# Patient Record
Sex: FEMALE | Employment: STUDENT | URBAN - METROPOLITAN AREA
[De-identification: names, ages, dates, MRNs, and addresses within clinical notes are randomized per-mention and may not be internally consistent; named-entity substitution may affect disease eponyms.]

---

## 2023-06-07 ENCOUNTER — OFFICE VISIT (OUTPATIENT)
Dept: OCCUPATIONAL MEDICINE | Age: 33
End: 2023-06-07
Attending: PHYSICIAN ASSISTANT

## 2023-06-07 DIAGNOSIS — Z01.84 IMMUNITY STATUS TESTING: Primary | ICD-10-CM

## 2023-06-07 LAB
RUBV IGG SER QL: POSITIVE
RUBV IGG SER-ACNC: 37.1 IU/ML (ref 10–?)

## 2023-06-07 PROCEDURE — 86735 MUMPS ANTIBODY: CPT

## 2023-06-07 PROCEDURE — 86762 RUBELLA ANTIBODY: CPT

## 2023-06-07 PROCEDURE — 86765 RUBEOLA ANTIBODY: CPT

## 2023-06-09 ENCOUNTER — OFFICE VISIT (OUTPATIENT)
Dept: OCCUPATIONAL MEDICINE | Age: 33
End: 2023-06-09
Attending: PHYSICIAN ASSISTANT

## 2023-06-09 LAB
MEV IGG SER-ACNC: 10.7 AU/ML (ref 16.5–?)
MUV IGG SER IA-ACNC: 29.6 AU/ML (ref 11–?)

## 2023-06-14 ENCOUNTER — OFFICE VISIT (OUTPATIENT)
Dept: OCCUPATIONAL MEDICINE | Age: 33
End: 2023-06-14
Attending: PHYSICIAN ASSISTANT

## 2024-05-14 ENCOUNTER — LAB REQUISITION (OUTPATIENT)
Age: 34
End: 2024-05-14

## 2024-05-14 DIAGNOSIS — R10.13 EPIGASTRIC PAIN: ICD-10-CM

## 2024-05-14 DIAGNOSIS — K62.5 RECTAL BLEEDING: ICD-10-CM

## 2024-05-14 DIAGNOSIS — K21.9 GERD (GASTROESOPHAGEAL REFLUX DISEASE): ICD-10-CM

## 2024-05-14 DIAGNOSIS — Z80.0 FAMILY HISTORY OF COLON CANCER: ICD-10-CM

## 2024-05-14 PROCEDURE — 88305 TISSUE EXAM BY PATHOLOGIST: CPT | Performed by: INTERNAL MEDICINE

## 2025-01-15 ENCOUNTER — OFFICE VISIT (OUTPATIENT)
Dept: INTERNAL MEDICINE CLINIC | Facility: CLINIC | Age: 35
End: 2025-01-15
Payer: COMMERCIAL

## 2025-01-15 ENCOUNTER — LAB ENCOUNTER (OUTPATIENT)
Dept: LAB | Age: 35
End: 2025-01-15
Attending: INTERNAL MEDICINE
Payer: COMMERCIAL

## 2025-01-15 VITALS
HEIGHT: 67 IN | TEMPERATURE: 98 F | OXYGEN SATURATION: 98 % | DIASTOLIC BLOOD PRESSURE: 82 MMHG | RESPIRATION RATE: 16 BRPM | HEART RATE: 78 BPM | WEIGHT: 207.63 LBS | SYSTOLIC BLOOD PRESSURE: 110 MMHG | BODY MASS INDEX: 32.59 KG/M2

## 2025-01-15 DIAGNOSIS — E61.1 IRON DEFICIENCY: ICD-10-CM

## 2025-01-15 DIAGNOSIS — R51.9 CHRONIC NONINTRACTABLE HEADACHE, UNSPECIFIED HEADACHE TYPE: ICD-10-CM

## 2025-01-15 DIAGNOSIS — R79.89 ELEVATED PLATELET COUNT: Primary | ICD-10-CM

## 2025-01-15 DIAGNOSIS — R79.89 ELEVATED PLATELET COUNT: ICD-10-CM

## 2025-01-15 DIAGNOSIS — G89.29 CHRONIC NONINTRACTABLE HEADACHE, UNSPECIFIED HEADACHE TYPE: ICD-10-CM

## 2025-01-15 DIAGNOSIS — E66.9 OBESITY (BMI 30-39.9): ICD-10-CM

## 2025-01-15 LAB
BASOPHILS # BLD AUTO: 0.05 X10(3) UL (ref 0–0.2)
BASOPHILS NFR BLD AUTO: 0.4 %
DEPRECATED HBV CORE AB SER IA-ACNC: 22 NG/ML
EOSINOPHIL # BLD AUTO: 0.14 X10(3) UL (ref 0–0.7)
EOSINOPHIL NFR BLD AUTO: 1.2 %
ERYTHROCYTE [DISTWIDTH] IN BLOOD BY AUTOMATED COUNT: 13.7 %
HCT VFR BLD AUTO: 40.5 %
HGB BLD-MCNC: 13.2 G/DL
IMM GRANULOCYTES # BLD AUTO: 0.05 X10(3) UL (ref 0–1)
IMM GRANULOCYTES NFR BLD: 0.4 %
IRON SATN MFR SERPL: 14 %
IRON SERPL-MCNC: 50 UG/DL
LYMPHOCYTES # BLD AUTO: 2.32 X10(3) UL (ref 1–4)
LYMPHOCYTES NFR BLD AUTO: 19.1 %
MCH RBC QN AUTO: 29.7 PG (ref 26–34)
MCHC RBC AUTO-ENTMCNC: 32.6 G/DL (ref 31–37)
MCV RBC AUTO: 91.2 FL
MONOCYTES # BLD AUTO: 0.57 X10(3) UL (ref 0.1–1)
MONOCYTES NFR BLD AUTO: 4.7 %
NEUTROPHILS # BLD AUTO: 9.02 X10 (3) UL (ref 1.5–7.7)
NEUTROPHILS # BLD AUTO: 9.02 X10(3) UL (ref 1.5–7.7)
NEUTROPHILS NFR BLD AUTO: 74.2 %
PLATELET # BLD AUTO: 383 10(3)UL (ref 150–450)
RBC # BLD AUTO: 4.44 X10(6)UL
TOTAL IRON BINDING CAPACITY: 366 UG/DL (ref 250–425)
TRANSFERRIN SERPL-MCNC: 304 MG/DL (ref 250–380)
WBC # BLD AUTO: 12.2 X10(3) UL (ref 4–11)

## 2025-01-15 PROCEDURE — 85025 COMPLETE CBC W/AUTO DIFF WBC: CPT | Performed by: INTERNAL MEDICINE

## 2025-01-15 PROCEDURE — 83540 ASSAY OF IRON: CPT | Performed by: INTERNAL MEDICINE

## 2025-01-15 PROCEDURE — 83550 IRON BINDING TEST: CPT | Performed by: INTERNAL MEDICINE

## 2025-01-15 PROCEDURE — 3008F BODY MASS INDEX DOCD: CPT | Performed by: INTERNAL MEDICINE

## 2025-01-15 PROCEDURE — 3079F DIAST BP 80-89 MM HG: CPT | Performed by: INTERNAL MEDICINE

## 2025-01-15 PROCEDURE — 99203 OFFICE O/P NEW LOW 30 MIN: CPT | Performed by: INTERNAL MEDICINE

## 2025-01-15 PROCEDURE — 82728 ASSAY OF FERRITIN: CPT | Performed by: INTERNAL MEDICINE

## 2025-01-15 PROCEDURE — 3074F SYST BP LT 130 MM HG: CPT | Performed by: INTERNAL MEDICINE

## 2025-01-15 RX ORDER — IRON HEME POLYPEPTIDE/FOLIC AC 12-1MG
TABLET ORAL
COMMUNITY

## 2025-01-15 RX ORDER — IRON,CARB/VIT C/VIT B12/FOLIC 100-250-1
TABLET ORAL
COMMUNITY

## 2025-01-15 RX ORDER — LEVONORGESTREL 52 MG/1
INTRAUTERINE DEVICE INTRAUTERINE
COMMUNITY
Start: 2024-10-23

## 2025-01-15 RX ORDER — BACILLUS COAGULANS/INULIN 1B-250 MG
CAPSULE ORAL
COMMUNITY

## 2025-01-15 NOTE — PROGRESS NOTES
[facility] Internal Medicine Office Note  Chief Complaint:   Chief Complaint   Patient presents with    New Patient       HPI:   This is a 34 year old female coming in for establishing care  HPI    She was taking compounded semaglutide for weight loss but it was getting too expensive   Wegovy denied by her insurance previously and a prior authorization was not completed as her physician left the practice  She notes history of binge eating.     Pap smear is up to date; record not available    Platelets were high on recent blood work; record not available   Normal platelet level at Dr. Boyd earlier in 2024      PMH:   -Depression - sees psychiatrist and on prozac.   -Binge eating   -PMDD  -Bleeding with straining for bowel movements. Saw Dr. Boyd and had EGD and colonoscopy which showed gastritis and diverticulosis and internal hemorrhoids     PSH: knee arthroscopy and repair of ligament and completed physical therapy     \"Bad headaches\". Some unilateral and extend back. 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     IUD placed in Oct   Spotting just stopped     Annual physical was in April 2024      Patient Active Problem List   Diagnosis    Routine general medical examination at a health care facility    Screening for tuberculosis    Alcohol use    Premenopausal patient    Family history of anemia     Past Surgical History:   Procedure Laterality Date    Oral surgery procedure Bilateral     wisdom teeth removed     Family History   Problem Relation Age of Onset    High Blood Pressure Father     Colon Cancer Maternal Cousin Male     Crohn's Disease Maternal Uncle         I reviewed her's Past Medical History, Past Surgical History, Family History and   Social History updated shows  Social History     Socioeconomic History    Marital status:     Tobacco Use    Smoking status: Never   Vaping Use    Vaping status: Never Used   Substance and Sexual Activity    Alcohol use: Yes     Alcohol/week: 0.0 standard drinks of alcohol    Drug use: Yes     Types: Cannabis     Comment: gummies occ     Social Drivers of Health     Food Insecurity: No Food Insecurity (1/15/2025)    NCSS - Food Insecurity     Worried About Running Out of Food in the Last Year: No     Ran Out of Food in the Last Year: No   Transportation Needs: No Transportation Needs (1/15/2025)    NCSS - Transportation     Lack of Transportation: No    [facility]    Social Connections   Housing Stability: Not At Risk (1/15/2025)    NCSS - Housing/Utilities     Has Housing: Yes     Worried About Losing Housing: No     Unable to Get Utilities: No     Allergies:  Allergies[1]  Current Outpatient Medications   Medication Sig Dispense Refill    Bacillus Coagulans-Inulin (PROBIOTIC) 1-250 BILLION-MG Oral Cap       cholecalciferol (DIALYVITE VITAMIN D 5000) 125 MCG (5000 UT) Oral Cap       FLUoxetine 20 MG Oral Cap Take 3 capsules (60 mg total) by mouth daily.      Iron-Vit C-Vit B12-Folic Acid (IRON 100 PLUS) 100-250-0.025-1 MG Oral Tab       Levonorgestrel (MIRENA, 52 MG,) 20 MCG/DAY Intrauterine IUD       L-Lysine 1000 MG Oral Tab       omeprazole 20 MG Oral Capsule Delayed Release Take 1 capsule (20 mg total) by mouth before breakfast.      Multiple Vitamin (MULTI-VITAMIN DAILY) Oral Tab Take 1 tablet by mouth daily.           REVIEW OF SYSTEMS:   Review of Systems   Constitutional:  Negative for fever.   HENT:  Negative for congestion.    Eyes:  Negative for visual disturbance.   Respiratory:  Negative for shortness of breath.    Cardiovascular:  Negative for chest pain.   Gastrointestinal:  Negative for constipation.   Genitourinary:  Negative for dysuria.   Neurological: Negative.    Hematological: Negative.    Psychiatric/Behavioral:  Negative.          EXAM:   /82   Pulse 78   Temp 97.8 °F (36.6 °C) (Temporal)   Resp 16   Ht 5' 7\" (1.702 m)   Wt 207 lb 9.6 oz (94.2 kg)   SpO2 98%   BMI 32.51 kg/m²  Estimated body mass index is 32.51 kg/m² as calculated from the following:    Height as of this encounter: 5' 7\" (1.702 m).    Weight as of this encounter: 207 lb 9.6 oz (94.2 kg).   Vital signs reviewed. Appears stated age, well groomed.  Physical Exam  Vitals reviewed.   Constitutional:       General: She is not in acute distress.     Appearance: She is well-developed.   HENT:      Head: Normocephalic and atraumatic.   Eyes:      Conjunctiva/sclera: Conjunctivae normal.   Cardiovascular:      Rate and Rhythm: Normal rate and regular rhythm.      Heart sounds: Normal heart sounds.   Pulmonary:      Effort: Pulmonary effort is normal.      Breath sounds: Normal breath sounds.   Musculoskeletal:      Cervical back: Neck supple.   Skin:     General: Skin is warm and dry.   Neurological:      Mental Status: She is alert.          ASSESSMENT AND PLAN:   Adriana Carlos is a 34 year old female with  1. Elevated platelet count    2. Chronic nonintractable headache, unspecified headache type    3. Iron deficiency          The plan is as follows  Adriana was seen today for new patient.    Diagnoses and all orders for this visit:    Elevated platelet count -recheck platelet level.  Outside record not available but has been requested.  -     CBC With Differential With Platelet; Future    Chronic nonintractable headache, unspecified headache type - Take aleve/naproxen 2 tablets at onset of headache pain and take with food. Can take every 12 hours    Iron deficiency -low iron level previously, recheck  -     Iron And Tibc [E]; Future  -     Ferritin [E]; Future    Obesity (BMI 30-39.9) -she previously took compounded semaglutide but discontinued due to cost.  Instructed her to reach out to her insurance to find out if Wegovy is covered.  We also  discussed Contrave as another option especially in the setting of history of binge eating.  Instructed patient to discuss with her psychiatrist about the addition of Contrave due to bupropion component interaction with fluoxetine.  She voiced understanding and will update us on these items via [employer] or calling the office.  Discussed potential side effects and demonstrated how to use Wegovy.  Discussed potential side effects of Contrave.  Gave information for the medication websites to learn more about them    Orders Placed This Encounter   Procedures    CBC With Differential With Platelet    Iron And Tibc [E]    Ferritin [E]       Meds & Refills for this Visit:  Requested Prescriptions      No prescriptions requested or ordered in this encounter       Imaging & Consults:  None    Health Maintenance Due   Topic Date Due    Annual Physical  Never done    Pap Smear  08/24/2018    COVID-19 Vaccine (3 - 2024-25 season) 09/01/2024    Influenza Vaccine (1) 10/01/2024    Annual Depression Screening  Never done     Patient/Caregiver Education: Patient/Caregiver Education: There are no barriers to learning. Medical education done. Outcome: Patient verbalizes understanding. Patient is notified to call with any questions, complications, allergies, or worsening or changing symptoms.  Patient is to call with any side effects or complications from the treatments as a result of today.     Isatu Li MD         [1]   Allergies  Allergen Reactions    Clindamycin HIVES, RASH and SWELLING    Sulfamethoxazole W/Trimethoprim HIVES, RASH and SWELLING    Sulfa Antibiotics

## 2025-01-15 NOTE — PATIENT INSTRUCTIONS
Blood work     Check with your insurance about coverage for Wegovy, Contrave   [email or web address]    Check with your psychiatrist about adding contrave (bupropion, naltrexone).   [email or web address]     Take aleve/naproxen 2 tablets at onset of headache pain and take with food. Can take every 12 hours

## 2025-01-17 DIAGNOSIS — R79.89 ELEVATED PLATELET COUNT: Primary | ICD-10-CM

## 2025-01-23 ENCOUNTER — E-VISIT (OUTPATIENT)
Dept: TELEHEALTH | Age: 35
End: 2025-01-23
Payer: COMMERCIAL

## 2025-01-23 DIAGNOSIS — A08.4 VIRAL GASTROENTERITIS: Primary | ICD-10-CM

## 2025-01-23 DIAGNOSIS — Z02.89 ENCOUNTER TO OBTAIN EXCUSE FROM WORK: ICD-10-CM

## 2025-01-23 NOTE — PROGRESS NOTES
Adriana Carlos is a 34 year old female who initiated e-visit care today.    HPI:   See answers to questionnaire submission     Current Outpatient Medications   Medication Sig Dispense Refill    Bacillus Coagulans-Inulin (PROBIOTIC) 1-250 BILLION-MG Oral Cap       cholecalciferol (DIALYVITE VITAMIN D 5000) 125 MCG (5000 UT) Oral Cap       FLUoxetine 20 MG Oral Cap Take 3 capsules (60 mg total) by mouth daily.      Iron-Vit C-Vit B12-Folic Acid (IRON 100 PLUS) 100-250-0.025-1 MG Oral Tab       Levonorgestrel (MIRENA, 52 MG,) 20 MCG/DAY Intrauterine IUD       L-Lysine 1000 MG Oral Tab       omeprazole 20 MG Oral Capsule Delayed Release Take 1 capsule (20 mg total) by mouth before breakfast.      Multiple Vitamin (MULTI-VITAMIN DAILY) Oral Tab Take 1 tablet by mouth daily.        No past medical history on file.   Past Surgical History:   Procedure Laterality Date    Oral surgery procedure Bilateral     wisdom teeth removed      Family History   Problem Relation Age of Onset    High Blood Pressure Father     Colon Cancer Maternal Cousin Male     Crohn's Disease Maternal Uncle       Social History:  Social History     Socioeconomic History    Marital status:    Tobacco Use    Smoking status: Never   Vaping Use    Vaping status: Never Used   Substance and Sexual Activity    Alcohol use: Yes     Alcohol/week: 0.0 standard drinks of alcohol    Drug use: Yes     Types: Cannabis     Comment: gummies occ     Social Drivers of Health     Food Insecurity: No Food Insecurity (1/15/2025)    NCSS - Food Insecurity     Worried About Running Out of Food in the Last Year: No     Ran Out of Food in the Last Year: No   Transportation Needs: No Transportation Needs (1/15/2025)    NCSS - Transportation     Lack of Transportation: No    [facility]    Social Connections   Housing Stability: Not At Risk (1/15/2025)    NCSS - Housing/Utilities     Has Housing: Yes      Worried About Losing Housing: No     Unable to Get Utilities: No         ASSESSMENT AND PLAN:       Diagnoses and all orders for this visit:    Viral gastroenteritis    Encounter to obtain excuse from work       Comfort care.  Work note provided.     Duration of  the service:  12 minutes      See [employer] message exchange and Patient Instructions for Comfort Care and patient education.

## 2025-02-05 ENCOUNTER — PATIENT MESSAGE (OUTPATIENT)
Dept: INTERNAL MEDICINE CLINIC | Facility: CLINIC | Age: 35
End: 2025-02-05

## 2025-02-05 RX ORDER — NALTREXONE HYDROCHLORIDE AND BUPROPION HYDROCHLORIDE 8; 90 MG/1; MG/1
TABLET, EXTENDED RELEASE ORAL
Qty: 70 TABLET | Refills: 0 | Status: CANCELLED | OUTPATIENT
Start: 2025-02-05 | End: 2025-03-07

## 2025-02-05 NOTE — TELEPHONE ENCOUNTER
Dr. Li- Patient is requesting Contrave? Pended order. Please sign if agree. TY        Lov- 1/15/25  Instructed patient to discuss with her psychiatrist about the addition of Contrave due to bupropion component interaction with fluoxetine. She voiced understanding and will update us on these items via [employer] or calling the office. Discussed potential side effects and demonstrated how to use Wegovy. Discussed potential side effects of Contrave. Gave information for the medication websites to learn more about them

## 2025-02-07 RX ORDER — BUPROPION HYDROCHLORIDE 150 MG/1
150 TABLET ORAL DAILY
Qty: 90 TABLET | Refills: 1 | Status: SHIPPED | OUTPATIENT
Start: 2025-02-07

## 2025-02-07 RX ORDER — NALTREXONE HYDROCHLORIDE 50 MG/1
25 TABLET, FILM COATED ORAL DAILY
Qty: 45 TABLET | Refills: 1 | Status: SHIPPED | OUTPATIENT
Start: 2025-02-07

## 2025-04-21 ENCOUNTER — LAB ENCOUNTER (OUTPATIENT)
Dept: LAB | Age: 35
End: 2025-04-21
Attending: INTERNAL MEDICINE
Payer: COMMERCIAL

## 2025-04-21 DIAGNOSIS — R79.89 ELEVATED PLATELET COUNT: ICD-10-CM

## 2025-04-21 LAB
BASOPHILS # BLD AUTO: 0.04 X10(3) UL (ref 0–0.2)
BASOPHILS NFR BLD AUTO: 0.3 %
EOSINOPHIL # BLD AUTO: 0.2 X10(3) UL (ref 0–0.7)
EOSINOPHIL NFR BLD AUTO: 1.6 %
ERYTHROCYTE [DISTWIDTH] IN BLOOD BY AUTOMATED COUNT: 13.5 %
HCT VFR BLD AUTO: 36.9 % (ref 35–48)
HGB BLD-MCNC: 12.2 G/DL (ref 12–16)
IMM GRANULOCYTES # BLD AUTO: 0.06 X10(3) UL (ref 0–1)
IMM GRANULOCYTES NFR BLD: 0.5 %
LYMPHOCYTES # BLD AUTO: 2.71 X10(3) UL (ref 1–4)
LYMPHOCYTES NFR BLD AUTO: 21.2 %
MCH RBC QN AUTO: 29.9 PG (ref 26–34)
MCHC RBC AUTO-ENTMCNC: 33.1 G/DL (ref 31–37)
MCV RBC AUTO: 90.4 FL (ref 80–100)
MONOCYTES # BLD AUTO: 0.77 X10(3) UL (ref 0.1–1)
MONOCYTES NFR BLD AUTO: 6 %
NEUTROPHILS # BLD AUTO: 9.02 X10 (3) UL (ref 1.5–7.7)
NEUTROPHILS # BLD AUTO: 9.02 X10(3) UL (ref 1.5–7.7)
NEUTROPHILS NFR BLD AUTO: 70.4 %
PLATELET # BLD AUTO: 341 10(3)UL (ref 150–450)
RBC # BLD AUTO: 4.08 X10(6)UL (ref 3.8–5.3)
WBC # BLD AUTO: 12.8 X10(3) UL (ref 4–11)

## 2025-04-21 PROCEDURE — 36415 COLL VENOUS BLD VENIPUNCTURE: CPT

## 2025-04-21 PROCEDURE — 85025 COMPLETE CBC W/AUTO DIFF WBC: CPT

## 2025-06-18 ENCOUNTER — PATIENT MESSAGE (OUTPATIENT)
Dept: INTERNAL MEDICINE CLINIC | Facility: CLINIC | Age: 35
End: 2025-06-18

## (undated) NOTE — LETTER
Date: 1/23/2025    Patient Name: Adriana Carlos          To Whom it may concern:    This letter has been written at the patient's request. The above patient was seen at [facility] for treatment of a medical condition.    This patient should be excused from attending work 1/21/25-1/24/25.    The patient may return to work on Monday 1/27/25.         Sincerely,        Mona Tran PA-C